# Patient Record
Sex: FEMALE | Race: WHITE | Employment: FULL TIME | URBAN - METROPOLITAN AREA
[De-identification: names, ages, dates, MRNs, and addresses within clinical notes are randomized per-mention and may not be internally consistent; named-entity substitution may affect disease eponyms.]

---

## 2017-02-06 ENCOUNTER — HOSPITAL ENCOUNTER (OUTPATIENT)
Dept: ENDOSCOPY | Age: 47
Discharge: HOME | End: 2017-02-06
Attending: INTERNAL MEDICINE | Admitting: INTERNAL MEDICINE

## 2017-02-06 LAB — HCG(URINE) PREGNANCY TEST: NEGATIVE

## 2017-02-06 RX ORDER — SODIUM CHLORIDE 9 MG/ML
INJECTION, SOLUTION INTRAVENOUS CONTINUOUS
Status: DISCONTINUED | OUTPATIENT
Start: 2017-02-06 | End: 2017-02-07 | Stop reason: HOSPADM

## 2017-02-06 RX ORDER — SODIUM CHLORIDE 0.9 % (FLUSH) 0.9 %
10 SYRINGE (ML) INJECTION PRN
Status: DISCONTINUED | OUTPATIENT
Start: 2017-02-06 | End: 2017-02-07 | Stop reason: HOSPADM

## 2017-02-06 RX ORDER — SODIUM CHLORIDE 0.9 % (FLUSH) 0.9 %
10 SYRINGE (ML) INJECTION EVERY 12 HOURS SCHEDULED
Status: DISCONTINUED | OUTPATIENT
Start: 2017-02-06 | End: 2017-02-07 | Stop reason: HOSPADM

## 2017-06-29 RX ORDER — ATENOLOL 25 MG/1
TABLET ORAL
Qty: 30 TABLET | Refills: 0 | Status: SHIPPED | OUTPATIENT
Start: 2017-06-29 | End: 2017-10-02 | Stop reason: SDUPTHER

## 2017-07-17 ENCOUNTER — TELEPHONE (OUTPATIENT)
Dept: CARDIOLOGY CLINIC | Age: 47
End: 2017-07-17

## 2017-09-28 RX ORDER — ATENOLOL 25 MG/1
TABLET ORAL
Qty: 30 TABLET | Refills: 0 | OUTPATIENT
Start: 2017-09-28

## 2017-10-02 RX ORDER — ATENOLOL 25 MG/1
TABLET ORAL
Qty: 30 TABLET | Refills: 0 | Status: SHIPPED | OUTPATIENT
Start: 2017-10-02 | End: 2017-10-20 | Stop reason: SDUPTHER

## 2017-10-02 NOTE — TELEPHONE ENCOUNTER
Pt called I have appt scheduled with [facility] for 10/20/17 and pt needs atenolol (TENORMIN) 25 MG tablet however pt pharmacy is out. Pt is totally out of medication so please call to adv as soon as possible with a alternative medication.   Thank you CSF

## 2017-10-19 NOTE — PROGRESS NOTES
[address]   Electrophysiology Follow up    Referring Provider:  Mg Clancy MD     Chief Complaint   Patient presents with    Tachycardia     no rapid palps    Palpitations     uncommon skips       HPI:  Julia Zabala is a 55 y.o. female is here for her routine follow up visit for her arrhythmia. She underwent ablation for SVT (AT)  years ago in [place]. Echo in 2005 was normal, showing EF 60%. She remains on Atenolol. 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 She was seen in the ER at [facility] in 2014 for increased palpitations; EKG showed SR with PVC's by report. She was also dx'd with a Cerebellar aneurysm around the same time, which in retrospect was present for years and unchanged in size. Today, she states that overall she feels well. She has uncommon skip beats and no racing feelings. She goes in for MRI scan about once a year for surveillance of cerebellar aneurysm which has been stable. She denies chest pain, GRADY, dizziness. She exercises regularly by playing tennis and doing pilates. She does not smoke. She is compliant with Atenolol which she tolerates without side effects. 8/29/14  MRI of head  Impression:  1. Stable 2 mm vascular prominence at the origin of the right superior  cerebellar artery suspicious for a tiny aneurysm or infundibulum. Continued followup is recommended. Past Medical History:   has a past medical history of SVT (supraventricular tachycardia) ([address] Surgical History:   has a past surgical history that includes ablation of dysrhythmic focus. Social History:   reports that she quit smoking about 25 years ago. She has never used smokeless tobacco. She reports that she drinks alcohol. She reports that she does not use drugs. Family History:  family history is not on file.      Home Medications:  Outpatient Encounter Prescriptions as of 10/20/2017   Medication Sig Dispense Refill    atenolol (TENORMIN) 25 of concern. Currently on Atenolol 25 mg daily. EKG today SB with HR 57  Wll continue treatment with beta blockers as her palpitations are well tolerated overall. 2.  SVT ( AT): s/p ablation years ago. Plan:  Lucretia Carpenter has a stable cardiac status. 1.  Continue Atenolol 25 mg daily  2. Follow up in one year or sooner if needed. Delfina Jalloh.  Amanda CORONEL

## 2017-10-20 ENCOUNTER — OFFICE VISIT (OUTPATIENT)
Dept: CARDIOLOGY CLINIC | Age: 47
End: 2017-10-20

## 2017-10-20 VITALS
OXYGEN SATURATION: 97 % | WEIGHT: 125.4 LBS | HEIGHT: 65 IN | HEART RATE: 68 BPM | BODY MASS INDEX: 20.89 KG/M2 | SYSTOLIC BLOOD PRESSURE: 114 MMHG | DIASTOLIC BLOOD PRESSURE: 72 MMHG

## 2017-10-20 DIAGNOSIS — I47.1 SVT (SUPRAVENTRICULAR TACHYCARDIA) (HCC): Primary | ICD-10-CM

## 2017-10-20 DIAGNOSIS — R00.2 PALPITATIONS: ICD-10-CM

## 2017-10-20 DIAGNOSIS — I47.1 ATRIAL TACHYCARDIA (HCC): ICD-10-CM

## 2017-10-20 PROCEDURE — G8420 CALC BMI NORM PARAMETERS: HCPCS | Performed by: INTERNAL MEDICINE

## 2017-10-20 PROCEDURE — G8484 FLU IMMUNIZE NO ADMIN: HCPCS | Performed by: INTERNAL MEDICINE

## 2017-10-20 PROCEDURE — 93000 ELECTROCARDIOGRAM COMPLETE: CPT | Performed by: INTERNAL MEDICINE

## 2017-10-20 PROCEDURE — G8427 DOCREV CUR MEDS BY ELIG CLIN: HCPCS | Performed by: INTERNAL MEDICINE

## 2017-10-20 PROCEDURE — 99213 OFFICE O/P EST LOW 20 MIN: CPT | Performed by: INTERNAL MEDICINE

## 2017-10-20 PROCEDURE — 1036F TOBACCO NON-USER: CPT | Performed by: INTERNAL MEDICINE

## 2017-10-20 RX ORDER — ATENOLOL 25 MG/1
TABLET ORAL
Qty: 90 TABLET | Refills: 3 | Status: CANCELLED | OUTPATIENT
Start: 2017-10-20

## 2017-10-20 RX ORDER — ATENOLOL 25 MG/1
TABLET ORAL
Qty: 90 TABLET | Refills: 3 | Status: SHIPPED | OUTPATIENT
Start: 2017-10-20 | End: 2018-09-17

## 2017-10-20 NOTE — LETTER
[address] [address] [address] [address]  Phone: [phone]  Fax: [phone]    Linda Baca MD        November 6, 2017     Marilyn Stephenson, [address] [place] [address] [address] [id number]    Patient: Alexandra Guzman  MR Number: [id number]  YOB: 1970  Date of Visit: 10/20/2017    Dear Dr. Marilyn Stephenson:    Below are the relevant portions of my assessment and plan of care. [address]   Electrophysiology Follow up    Referring Provider:  Marilyn Stephenson MD     Chief Complaint   Patient presents with    Tachycardia     no rapid palps    Palpitations     uncommon skips       HPI:  Alexandra Guzman is a 55 y.o. female is here for her routine follow up visit for her arrhythmia. She underwent ablation for SVT (AT)  years ago in [place]. Echo in 2005 was normal, showing EF 60%. She remains on Atenolol. 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 She was seen in the ER at [facility] in 2014 for increased palpitations; EKG showed SR with PVC's by report. She was also dx'd with a Cerebellar aneurysm around the same time, which in retrospect was present for years and unchanged in size. Today, she states that overall she feels well. She has uncommon skip beats and no racing feelings. She goes in for MRI scan about once a year for surveillance of cerebellar aneurysm which has been stable. She denies chest pain, GRADY, dizziness. She exercises regularly by playing tennis and doing pilates. She does not smoke. She is compliant with Atenolol which she tolerates without side effects. 8/29/14  MRI of head  Impression:  1. Stable 2 mm vascular prominence at the origin of the right superior  cerebellar artery suspicious for a tiny aneurysm or infundibulum. Continued followup is recommended. Past Medical History:   has a past medical history of SVT (supraventricular tachycardia) ([address]     Surgical History: has a past surgical history that includes ablation of dysrhythmic focus. Social History:   reports that she quit smoking about 25 years ago. She has never used smokeless tobacco. She reports that she drinks alcohol. She reports that she does not use drugs. Family History:  family history is not on file. Home Medications:  Outpatient Encounter Prescriptions as of 10/20/2017   Medication Sig Dispense Refill    atenolol (TENORMIN) 25 MG tablet TAKE 1 TABLET BY MOUTH DAILY 90 tablet 3    Dexlansoprazole (DEXILANT PO) Take by mouth      [DISCONTINUED] atenolol (TENORMIN) 25 MG tablet TAKE 1 TABLET BY MOUTH DAILY 30 tablet 0    ALPRAZolam (XANAX) 0.25 MG tablet Take 0.25 mg by mouth as needed for Sleep (Only when she fly/)        No facility-administered encounter medications on file as of 10/20/2017. Allergies:  Diflucan [fluconazole]     Review of Systems   Constitutional: Negative for activity change and appetite change. HENT: Negative for facial swelling and neck pain. Eyes: Negative for discharge and itching. Respiratory: Negative for chest tightness and shortness of breath. Cardiovascular: few palpitations. Negative for chest pain. Negative for leg swelling. Gastrointestinal: Negative for abdominal pain and abdominal distention. Genitourinary: Negative. Musculoskeletal: Negative. Skin: Negative for color change and pallor. Neurological: Negative for dizziness, syncope and light-headedness. Hematological: Negative. Psychiatric/Behavioral: Negative for behavioral problems and agitation. /72 (Site: Left Arm, Position: Sitting, Cuff Size: Medium Adult)   Pulse 68   Ht 5' 5\" (1.651 m)   Wt 125 lb 6.4 oz (56.9 kg)   SpO2 97%   BMI 20.87 kg/m²      Objective:  Physical Exam   Nursing note and vitals reviewed. Constitutional: She appears well-developed and well-nourished. HENT: neg  Head:  atraumatic. Eyes: Right eye exhibits no discharge. Left eye exhibits no discharge. Neck: Neck supple. Cardiovascular: Bradycardic rate, regular rhythm and normal heart sounds without murmur, gallop, or rub. Pulmonary/Chest: Effort normal and breath sounds normal.   Abdominal: Soft. Musculoskeletal: She exhibits no edema. mild scoliosis  Neurological: She is alert. Skin: Skin is warm and dry. Psychiatric: She has a normal mood and affect. Assessment:   1. Palpitations:  No current palpitations of concern. Currently on Atenolol 25 mg daily. EKG today SB with HR 57  Wll continue treatment with beta blockers as her palpitations are well tolerated overall. 2.  SVT ( AT): s/p ablation years ago. Plan:  Samreen Cooper has a stable cardiac status. 1.  Continue Atenolol 25 mg daily  2. Follow up in one year or sooner if needed. Janet Moeller. Amanda CORONEL If you have questions, please do not hesitate to call me. I look forward to following Samreen Cooper along with you.     Sincerely,        Yenni Harris MD

## 2017-11-06 NOTE — COMMUNICATION BODY
[facility]   Electrophysiology Follow up    Referring Provider:  Mookie Louise MD     Chief Complaint   Patient presents with    Tachycardia     no rapid palps    Palpitations     uncommon skips       HPI:  Ayla Griffith is a 55 y.o. female is here for her routine follow up visit for her arrhythmia. She underwent ablation for SVT (AT)  years ago in [place]. Echo in 2005 was normal, showing EF 60%. She remains on Atenolol. 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 She was seen in the ER at [facility] in 2014 for increased palpitations; EKG showed SR with PVC's by report. She was also dx'd with a Cerebellar aneurysm around the same time, which in retrospect was present for years and unchanged in size. Today, she states that overall she feels well. She has uncommon skip beats and no racing feelings. She goes in for MRI scan about once a year for surveillance of cerebellar aneurysm which has been stable. She denies chest pain, GRADY, dizziness. She exercises regularly by playing tennis and doing pilates. She does not smoke. She is compliant with Atenolol which she tolerates without side effects. 8/29/14  MRI of head  Impression:  1. Stable 2 mm vascular prominence at the origin of the right superior  cerebellar artery suspicious for a tiny aneurysm or infundibulum. Continued followup is recommended. Past Medical History:   has a past medical history of SVT (supraventricular tachycardia) ([address] Surgical History:   has a past surgical history that includes ablation of dysrhythmic focus. Social History:   reports that she quit smoking about 25 years ago. She has never used smokeless tobacco. She reports that she drinks alcohol. She reports that she does not use drugs. Family History:  family history is not on file.      Home Medications:  Outpatient Encounter Prescriptions as of 10/20/2017   Medication Sig Dispense Refill    atenolol (TENORMIN) 25 MG tablet TAKE 1 TABLET BY MOUTH DAILY 90 tablet 3    Dexlansoprazole (DEXILANT PO) Take by mouth      [DISCONTINUED] atenolol (TENORMIN) 25 MG tablet TAKE 1 TABLET BY MOUTH DAILY 30 tablet 0    ALPRAZolam (XANAX) 0.25 MG tablet Take 0.25 mg by mouth as needed for Sleep (Only when she fly/)        No facility-administered encounter medications on file as of 10/20/2017. Allergies:  Diflucan [fluconazole]     Review of Systems   Constitutional: Negative for activity change and appetite change. HENT: Negative for facial swelling and neck pain. Eyes: Negative for discharge and itching. Respiratory: Negative for chest tightness and shortness of breath. Cardiovascular: few palpitations. Negative for chest pain. Negative for leg swelling. Gastrointestinal: Negative for abdominal pain and abdominal distention. Genitourinary: Negative. Musculoskeletal: Negative. Skin: Negative for color change and pallor. Neurological: Negative for dizziness, syncope and light-headedness. Hematological: Negative. Psychiatric/Behavioral: Negative for behavioral problems and agitation. /72 (Site: Left Arm, Position: Sitting, Cuff Size: Medium Adult)   Pulse 68   Ht 5' 5\" (1.651 m)   Wt 125 lb 6.4 oz (56.9 kg)   SpO2 97%   BMI 20.87 kg/m²      Objective:  Physical Exam   Nursing note and vitals reviewed. Constitutional: She appears well-developed and well-nourished. HENT: neg  Head:  atraumatic. Eyes: Right eye exhibits no discharge. Left eye exhibits no discharge. Neck: Neck supple. Cardiovascular: Bradycardic rate, regular rhythm and normal heart sounds without murmur, gallop, or rub. Pulmonary/Chest: Effort normal and breath sounds normal.   Abdominal: Soft. Musculoskeletal: She exhibits no edema. mild scoliosis  Neurological: She is alert. Skin: Skin is warm and dry. Psychiatric: She has a normal mood and affect. Assessment:   1.  Palpitations:  No current palpitations of concern. Currently on Atenolol 25 mg daily. EKG today SB with HR 57  Wll continue treatment with beta blockers as her palpitations are well tolerated overall. 2.  SVT ( AT): s/p ablation years ago. Plan:  Kasandra Knapp has a stable cardiac status. 1.  Continue Atenolol 25 mg daily  2. Follow up in one year or sooner if needed. Leonid Patel M.D.

## 2018-09-17 ENCOUNTER — TELEPHONE (OUTPATIENT)
Dept: CARDIOLOGY CLINIC | Age: 48
End: 2018-09-17

## 2018-09-17 ENCOUNTER — OFFICE VISIT (OUTPATIENT)
Dept: CARDIOLOGY CLINIC | Age: 48
End: 2018-09-17

## 2018-09-17 VITALS
HEIGHT: 64 IN | HEART RATE: 75 BPM | WEIGHT: 129 LBS | BODY MASS INDEX: 22.02 KG/M2 | DIASTOLIC BLOOD PRESSURE: 69 MMHG | SYSTOLIC BLOOD PRESSURE: 108 MMHG

## 2018-09-17 DIAGNOSIS — R00.2 PALPITATIONS: ICD-10-CM

## 2018-09-17 DIAGNOSIS — I47.1 ATRIAL TACHYCARDIA (HCC): Primary | ICD-10-CM

## 2018-09-17 DIAGNOSIS — R07.89 OTHER CHEST PAIN: ICD-10-CM

## 2018-09-17 PROCEDURE — 93000 ELECTROCARDIOGRAM COMPLETE: CPT | Performed by: NURSE PRACTITIONER

## 2018-09-17 PROCEDURE — 99214 OFFICE O/P EST MOD 30 MIN: CPT | Performed by: NURSE PRACTITIONER

## 2018-09-17 PROCEDURE — G8420 CALC BMI NORM PARAMETERS: HCPCS | Performed by: NURSE PRACTITIONER

## 2018-09-17 PROCEDURE — 1036F TOBACCO NON-USER: CPT | Performed by: NURSE PRACTITIONER

## 2018-09-17 PROCEDURE — G8427 DOCREV CUR MEDS BY ELIG CLIN: HCPCS | Performed by: NURSE PRACTITIONER

## 2018-09-17 RX ORDER — ATENOLOL 25 MG/1
12.5 TABLET ORAL DAILY
Qty: 90 TABLET | Refills: 2 | Status: SHIPPED | OUTPATIENT
Start: 2018-09-17 | End: 2019-01-09

## 2018-09-17 NOTE — TELEPHONE ENCOUNTER
Needs an appt for further evaluation.  Please call her schedule either at [facility] or [facility]. She can see a NP at [facility] or Amanda at [facility] or [facility]. Thanks!!

## 2018-09-17 NOTE — LETTER
worrying here and she attributed it to nerves/anxiety. She has been under a lot of stress and has a hx of anxiety w/some panic attacks. She became very worried and scheduled a stat visit to the office because she felt very fatigued while playing tennis this weekend and this is new for her. She lives a very active life and exercises frequently w/out symptoms. She hasn't taken her BB in > month      Past Medical History:   Diagnosis Date    SVT (supraventricular tachycardia) ([address]         Past Surgical History:   Procedure Laterality Date    ABLATION OF DYSRHYTHMIC FOCUS         Allergies: Allergies   Allergen Reactions    Diflucan [Fluconazole]      Palpitations         Social History:  Reviewed. reports that she quit smoking about 26 years ago. She has never used smokeless tobacco. She reports that she drinks alcohol. She reports that she does not use drugs. Family History:  Reviewed. family history is not on file. Review of System:  All other systems reviewed and are negative except for that noted above. Pertinent negatives are:     · General: negative for fever, chills   · Ophthalmic ROS: negative for - eye pain or loss of vision  · ENT ROS: negative for - headaches, sore throat   · Respiratory: negative for - cough, sputum  · Cardiovascular: Reviewed in HPI  · Gastrointestinal: negative for - abdominal pain, diarrhea, N/V  · Hematology: negative for - bleeding, blood clots, bruising or jaundice  · Genito-Urinary:  negative for - Dysuria or incontinence  · Musculoskeletal: negative for - Joint swelling, muscle pain  · Neurological: negative for - confusion, dizziness, headaches   · Psychiatric: No anxiety, no depression. · Dermatological: negative for - rash    Physical Examination:  Vitals:    09/17/18 1535   BP: 108/69   Pulse: 75        · Constitutional: Oriented. No distress. · Head: Normocephalic and atraumatic.    · Mouth/Throat: Oropharynx is clear and moist.

## 2018-09-17 NOTE — TELEPHONE ENCOUNTER
Assessment:   1. Palpitations:  No current palpitations of concern. Currently on Atenolol 25 mg daily. EKG today SB with HR 57  Wll continue treatment with beta blockers as her palpitations are well tolerated overall. 2.  SVT ( AT): s/p ablation years ago. Plan:  Shannon Solis has a stable cardiac status. 1.  Continue Atenolol 25 mg daily  2. Follow up in one year or sooner if needed. Neyda Ramos.  Amanda CORONEL

## 2018-09-17 NOTE — PROGRESS NOTES
rash noted. · Psychiatric: Has a normal behavior     Labs, diagnostic and imaging results reviewed. ECG: SR, rate 72    Stress Echo 11/2005 at [facility]  -No wall motion abnormalities  -EF 60%      8/29/14  MRI of head  Impression:  1. Stable 2 mm vascular prominence at the origin of the right superior  cerebellar artery suspicious for a tiny aneurysm or infundibulum. Continued followup is recommended. Medication:  Current Outpatient Prescriptions   Medication Sig Dispense Refill    atenolol (TENORMIN) 25 MG tablet TAKE 1 TABLET BY MOUTH DAILY 90 tablet 3    Dexlansoprazole (DEXILANT PO) Take by mouth      ALPRAZolam (XANAX) 0.25 MG tablet Take 0.25 mg by mouth as needed for Sleep (Only when she fly/)        No current facility-administered medications for this visit. Assessment     Interval History:   Presents today for an acute visit. Has been endorsing cp over the last two weeks. Sharp, 3/10, left sternal, non-radiating, not associated w/activity or rest, no exacerbating or alleviating factors, no diaphoresis, nausea, or associated symptoms. The chest pain wasn't really worrying here and she attributed it to nerves/anxiety. She has been under a lot of stress and has a hx of anxiety w/some panic attacks. She became very worried and scheduled a stat visit to the office because she felt very fatigued while playing tennis this weekend and this is new for her. She lives a very active life and exercises frequently w/out symptoms.   She hasn't taken her BB in > month    Chest pain  -Atypical, w/symptoms not appearing cardiac in nature  -Healthy, active, young, w/out personal of family sig CAD history  -EKG NSR w/out ischemic changes    Fatigue  -While playing tennis this weekend as above  -Low suspicion of cardiac etiology  -More likely dehydration and \"off day\", though can't fully r/out tachy induced since hasn't been taking BB    Palpitations  -Stopped taking atenolol 2/2 lack of

## 2018-10-01 PROBLEM — R07.89 OTHER CHEST PAIN: Status: ACTIVE | Noted: 2018-10-01

## 2018-12-28 ENCOUNTER — NURSE ONLY (OUTPATIENT)
Dept: CARDIOLOGY CLINIC | Age: 48
End: 2018-12-28
Payer: COMMERCIAL

## 2018-12-28 ENCOUNTER — TELEPHONE (OUTPATIENT)
Dept: CARDIOLOGY CLINIC | Age: 48
End: 2018-12-28

## 2018-12-28 DIAGNOSIS — R00.2 PALPITATIONS: Primary | ICD-10-CM

## 2018-12-28 PROCEDURE — 93000 ELECTROCARDIOGRAM COMPLETE: CPT | Performed by: INTERNAL MEDICINE

## 2018-12-28 NOTE — TELEPHONE ENCOUNTER
Pt calling has been feeling faint lately, she has an Apple watch and her HR has been showing really low at times. Needs to know what to do?  Pls call to advise Thank you

## 2019-01-08 ENCOUNTER — TELEPHONE (OUTPATIENT)
Dept: CARDIOLOGY CLINIC | Age: 49
End: 2019-01-08

## 2019-01-08 DIAGNOSIS — I47.1 ATRIAL TACHYCARDIA (HCC): Primary | ICD-10-CM

## 2019-01-08 DIAGNOSIS — R06.02 SOB (SHORTNESS OF BREATH): ICD-10-CM

## 2019-01-08 DIAGNOSIS — R00.2 PALPITATIONS: ICD-10-CM

## 2019-01-08 DIAGNOSIS — R53.83 OTHER FATIGUE: ICD-10-CM

## 2019-01-08 DIAGNOSIS — I49.8 OTHER CARDIAC ARRHYTHMIA: ICD-10-CM

## 2019-01-08 DIAGNOSIS — R07.89 OTHER CHEST PAIN: ICD-10-CM

## 2019-01-09 ENCOUNTER — TELEPHONE (OUTPATIENT)
Dept: CARDIOLOGY CLINIC | Age: 49
End: 2019-01-09

## 2019-01-09 ENCOUNTER — OFFICE VISIT (OUTPATIENT)
Dept: CARDIOLOGY CLINIC | Age: 49
End: 2019-01-09
Payer: COMMERCIAL

## 2019-01-09 ENCOUNTER — HOSPITAL ENCOUNTER (OUTPATIENT)
Age: 49
Discharge: HOME | End: 2019-01-09
Payer: COMMERCIAL

## 2019-01-09 VITALS
OXYGEN SATURATION: 99 % | DIASTOLIC BLOOD PRESSURE: 76 MMHG | HEART RATE: 74 BPM | HEIGHT: 64 IN | BODY MASS INDEX: 22.02 KG/M2 | SYSTOLIC BLOOD PRESSURE: 108 MMHG | WEIGHT: 129 LBS

## 2019-01-09 DIAGNOSIS — I47.1 SVT (SUPRAVENTRICULAR TACHYCARDIA) (HCC): ICD-10-CM

## 2019-01-09 DIAGNOSIS — R00.2 PALPITATIONS: ICD-10-CM

## 2019-01-09 DIAGNOSIS — I47.1 ATRIAL TACHYCARDIA (HCC): ICD-10-CM

## 2019-01-09 DIAGNOSIS — I47.1 ATRIAL TACHYCARDIA (HCC): Primary | ICD-10-CM

## 2019-01-09 LAB
A/G RATIO: 1.8 (ref 1.1–2.2)
ALBUMIN SERPL-MCNC: 4.6 G/DL (ref 3.4–5)
ALP BLD-CCNC: 50 U/L (ref 40–129)
ALT SERPL-CCNC: 15 U/L (ref 10–40)
ANION GAP SERPL CALCULATED.3IONS-SCNC: 18 MMOL/L (ref 3–16)
AST SERPL-CCNC: 18 U/L (ref 15–37)
BILIRUB SERPL-MCNC: 0.7 MG/DL (ref 0–1)
BUN BLDV-MCNC: 8 MG/DL (ref 7–20)
CALCIUM SERPL-MCNC: 9.6 MG/DL (ref 8.3–10.6)
CHLORIDE BLD-SCNC: 103 MMOL/L (ref 99–110)
CHOLESTEROL, TOTAL: 224 MG/DL (ref 0–199)
CO2: 23 MMOL/L (ref 21–32)
CREAT SERPL-MCNC: 0.8 MG/DL (ref 0.6–1.1)
GFR AFRICAN AMERICAN: >60
GFR NON-AFRICAN AMERICAN: >60
GLOBULIN: 2.6 G/DL
GLUCOSE BLD-MCNC: 88 MG/DL (ref 70–99)
HCT VFR BLD CALC: 41.9 % (ref 36–48)
HDLC SERPL-MCNC: 93 MG/DL (ref 40–60)
HEMOGLOBIN: 14.1 G/DL (ref 12–16)
LDL CHOLESTEROL CALCULATED: 122 MG/DL
MCH RBC QN AUTO: 32.8 PG (ref 26–34)
MCHC RBC AUTO-ENTMCNC: 33.7 G/DL (ref 31–36)
MCV RBC AUTO: 97.4 FL (ref 80–100)
PDW BLD-RTO: 13.6 % (ref 12.4–15.4)
PLATELET # BLD: 231 K/UL (ref 135–450)
PMV BLD AUTO: 8.2 FL (ref 5–10.5)
POTASSIUM SERPL-SCNC: 4.7 MMOL/L (ref 3.5–5.1)
RBC # BLD: 4.3 M/UL (ref 4–5.2)
SODIUM BLD-SCNC: 144 MMOL/L (ref 136–145)
TOTAL PROTEIN: 7.2 G/DL (ref 6.4–8.2)
TRIGL SERPL-MCNC: 43 MG/DL (ref 0–150)
TSH REFLEX: 1.37 UIU/ML (ref 0.27–4.2)
VLDLC SERPL CALC-MCNC: 9 MG/DL
WBC # BLD: 6 K/UL (ref 4–11)

## 2019-01-09 PROCEDURE — G8484 FLU IMMUNIZE NO ADMIN: HCPCS | Performed by: NURSE PRACTITIONER

## 2019-01-09 PROCEDURE — G8420 CALC BMI NORM PARAMETERS: HCPCS | Performed by: NURSE PRACTITIONER

## 2019-01-09 PROCEDURE — 1036F TOBACCO NON-USER: CPT | Performed by: NURSE PRACTITIONER

## 2019-01-09 PROCEDURE — 99214 OFFICE O/P EST MOD 30 MIN: CPT | Performed by: NURSE PRACTITIONER

## 2019-01-09 PROCEDURE — 36415 COLL VENOUS BLD VENIPUNCTURE: CPT

## 2019-01-09 PROCEDURE — 93000 ELECTROCARDIOGRAM COMPLETE: CPT | Performed by: NURSE PRACTITIONER

## 2019-01-09 PROCEDURE — 84443 ASSAY THYROID STIM HORMONE: CPT

## 2019-01-09 PROCEDURE — 85027 COMPLETE CBC AUTOMATED: CPT

## 2019-01-09 PROCEDURE — 80061 LIPID PANEL: CPT

## 2019-01-09 PROCEDURE — 80053 COMPREHEN METABOLIC PANEL: CPT

## 2019-01-09 PROCEDURE — G8427 DOCREV CUR MEDS BY ELIG CLIN: HCPCS | Performed by: NURSE PRACTITIONER

## 2019-01-10 ENCOUNTER — TELEPHONE (OUTPATIENT)
Dept: CARDIOLOGY CLINIC | Age: 49
End: 2019-01-10

## 2019-01-10 ENCOUNTER — HOSPITAL ENCOUNTER (OUTPATIENT)
Dept: NON INVASIVE DIAGNOSTICS | Age: 49
Discharge: HOME | End: 2019-01-10
Payer: COMMERCIAL

## 2019-01-10 DIAGNOSIS — R07.89 OTHER CHEST PAIN: ICD-10-CM

## 2019-01-10 LAB
LV EF: 50 %
LVEF MODALITY: NORMAL

## 2019-01-10 PROCEDURE — 93351 STRESS TTE COMPLETE: CPT

## 2019-01-10 PROCEDURE — 93320 DOPPLER ECHO COMPLETE: CPT

## 2019-01-30 ENCOUNTER — OFFICE VISIT (OUTPATIENT)
Dept: CARDIOLOGY CLINIC | Age: 49
End: 2019-01-30
Payer: COMMERCIAL

## 2019-01-30 VITALS
BODY MASS INDEX: 22.53 KG/M2 | WEIGHT: 132 LBS | HEART RATE: 68 BPM | SYSTOLIC BLOOD PRESSURE: 99 MMHG | DIASTOLIC BLOOD PRESSURE: 65 MMHG | HEIGHT: 64 IN

## 2019-01-30 DIAGNOSIS — I47.1 SVT (SUPRAVENTRICULAR TACHYCARDIA) (HCC): ICD-10-CM

## 2019-01-30 DIAGNOSIS — R00.2 PALPITATIONS: Primary | ICD-10-CM

## 2019-01-30 DIAGNOSIS — I49.8 OTHER CARDIAC ARRHYTHMIA: ICD-10-CM

## 2019-01-30 DIAGNOSIS — I47.1 ATRIAL TACHYCARDIA (HCC): ICD-10-CM

## 2019-01-30 PROCEDURE — 99214 OFFICE O/P EST MOD 30 MIN: CPT | Performed by: INTERNAL MEDICINE

## 2019-01-30 PROCEDURE — G8427 DOCREV CUR MEDS BY ELIG CLIN: HCPCS | Performed by: INTERNAL MEDICINE

## 2019-01-30 PROCEDURE — 1036F TOBACCO NON-USER: CPT | Performed by: INTERNAL MEDICINE

## 2019-01-30 PROCEDURE — G8484 FLU IMMUNIZE NO ADMIN: HCPCS | Performed by: INTERNAL MEDICINE

## 2019-01-30 PROCEDURE — 93000 ELECTROCARDIOGRAM COMPLETE: CPT | Performed by: INTERNAL MEDICINE

## 2019-01-30 PROCEDURE — G8420 CALC BMI NORM PARAMETERS: HCPCS | Performed by: INTERNAL MEDICINE

## 2019-01-31 PROCEDURE — 93272 ECG/REVIEW INTERPRET ONLY: CPT | Performed by: INTERNAL MEDICINE

## 2022-09-15 ENCOUNTER — HOSPITAL ENCOUNTER (OUTPATIENT)
Dept: MAMMOGRAPHY | Age: 52
Discharge: HOME | End: 2022-09-15
Payer: COMMERCIAL

## 2022-09-15 ENCOUNTER — HOSPITAL ENCOUNTER (OUTPATIENT)
Dept: ULTRASOUND IMAGING | Age: 52
Discharge: HOME | End: 2022-09-15
Payer: COMMERCIAL

## 2022-09-15 VITALS — WEIGHT: 123 LBS | BODY MASS INDEX: 21 KG/M2 | HEIGHT: 64 IN

## 2022-09-15 DIAGNOSIS — N63.0 BREAST LUMP: ICD-10-CM

## 2022-09-15 DIAGNOSIS — N61.0 INFLAMMATORY DISEASE OF BREAST: ICD-10-CM

## 2022-09-15 PROCEDURE — 76642 ULTRASOUND BREAST LIMITED: CPT

## 2022-09-15 PROCEDURE — G0279 TOMOSYNTHESIS, MAMMO: HCPCS

## 2023-11-06 ENCOUNTER — HOSPITAL ENCOUNTER (OUTPATIENT)
Dept: MAMMOGRAPHY | Age: 53
Discharge: HOME | End: 2023-11-06
Payer: COMMERCIAL

## 2023-11-06 ENCOUNTER — HOSPITAL ENCOUNTER (OUTPATIENT)
Dept: ULTRASOUND IMAGING | Age: 53
Discharge: HOME | End: 2023-11-06
Payer: COMMERCIAL

## 2023-11-06 VITALS — WEIGHT: 123 LBS | BODY MASS INDEX: 21 KG/M2 | HEIGHT: 64 IN

## 2023-11-06 DIAGNOSIS — R93.89 ABNORMAL ULTRASOUND: ICD-10-CM

## 2023-11-06 DIAGNOSIS — Z12.31 VISIT FOR SCREENING MAMMOGRAM: ICD-10-CM

## 2023-11-06 PROCEDURE — 76642 ULTRASOUND BREAST LIMITED: CPT

## 2023-11-06 PROCEDURE — 77063 BREAST TOMOSYNTHESIS BI: CPT

## 2024-01-09 ENCOUNTER — TELEPHONE (OUTPATIENT)
Dept: CARDIOLOGY CLINIC | Age: 54
End: 2024-01-09

## 2024-01-09 NOTE — TELEPHONE ENCOUNTER
Dawna is calling in to schedule f/u appt. Explained that Amanda is no longer practicing here. She last seen him in 2019.    Please assist on who I can schedule her with.    Dawna can be reached at [phone].

## 2024-01-10 NOTE — TELEPHONE ENCOUNTER
Looks like the patient lives around the [facility]. [facility] would be the closet to her with follow up with Dr. Flores.

## 2024-01-30 NOTE — PROGRESS NOTES
[facility]     Outpatient Cardiology         Patient Name:  Dawna Banuelos  Requesting Physician: No admitting provider for patient encounter.  Primary Care Physician: SHAY Staples MD    Reason for Consultation/Chief Complaint:   Chief Complaint   Patient presents with    Palpitations     Intermittent with bouts of tachycardia    Other     Hx of SVT and ablation          History of Present Illness:    HPI     Dawna Vazquez a 53 y.o. female with PMH of SVT, SVT ablation in [place], cerebellar aneurysm.    Here to establish care for SVT. Previously seen by Dr. Patel.   SVT had ablation in   Palpitations, ongoing for several weeks, HR as high as 190s (apple watch, no tracings), no other sxs, no particular triggering factors. Drinks 1 cup of coffee and 2 diet cokes.   Discussed CAC for screening purposes given HLD.   Otherwise no cardiac sxs.       PMH  Past Medical History:   Diagnosis Date    SVT (supraventricular tachycardia)        PSH  Past Surgical History:   Procedure Laterality Date    ABLATION OF DYSRHYTHMIC FOCUS      BREAST BIOPSY Right 2018    needle bx        Social HIstory  Social History     Tobacco Use    Smoking status: Former     Current packs/day: 0.00     Types: Cigarettes     Quit date: 1992     Years since quittin.0    Smokeless tobacco: Never   Substance Use Topics    Alcohol use: Yes     Comment: social    Drug use: No       Family History  Family History   Problem Relation Age of Onset    Heart Disease Neg Hx     High Blood Pressure Neg Hx     High Cholesterol Neg Hx        Allergies   Allergies   Allergen Reactions    Diflucan [Fluconazole]      Palpitations         Medications:     Home Medications:  Were reviewed and are listed in nursing record. and/or listed below    Prior to Admission medications    Medication Sig Start Date End Date Taking? Authorizing Provider   ALPRAZolam (XANAX) 0.25 MG tablet Take 1 tablet by mouth as needed

## 2024-01-31 ENCOUNTER — OFFICE VISIT (OUTPATIENT)
Dept: CARDIOLOGY CLINIC | Age: 54
End: 2024-01-31
Payer: COMMERCIAL

## 2024-01-31 VITALS
DIASTOLIC BLOOD PRESSURE: 64 MMHG | BODY MASS INDEX: 22.86 KG/M2 | HEART RATE: 58 BPM | WEIGHT: 133.2 LBS | SYSTOLIC BLOOD PRESSURE: 102 MMHG

## 2024-01-31 DIAGNOSIS — I47.10 SVT (SUPRAVENTRICULAR TACHYCARDIA): ICD-10-CM

## 2024-01-31 DIAGNOSIS — E78.2 MIXED HYPERLIPIDEMIA: ICD-10-CM

## 2024-01-31 DIAGNOSIS — I47.19 ATRIAL TACHYCARDIA: Primary | ICD-10-CM

## 2024-01-31 DIAGNOSIS — R00.2 PALPITATIONS: ICD-10-CM

## 2024-01-31 PROCEDURE — G8427 DOCREV CUR MEDS BY ELIG CLIN: HCPCS | Performed by: INTERNAL MEDICINE

## 2024-01-31 PROCEDURE — G8420 CALC BMI NORM PARAMETERS: HCPCS | Performed by: INTERNAL MEDICINE

## 2024-01-31 PROCEDURE — 99204 OFFICE O/P NEW MOD 45 MIN: CPT | Performed by: INTERNAL MEDICINE

## 2024-01-31 PROCEDURE — 93000 ELECTROCARDIOGRAM COMPLETE: CPT | Performed by: INTERNAL MEDICINE

## 2024-01-31 PROCEDURE — 3017F COLORECTAL CA SCREEN DOC REV: CPT | Performed by: INTERNAL MEDICINE

## 2024-01-31 PROCEDURE — 1036F TOBACCO NON-USER: CPT | Performed by: INTERNAL MEDICINE

## 2024-01-31 PROCEDURE — G8484 FLU IMMUNIZE NO ADMIN: HCPCS | Performed by: INTERNAL MEDICINE

## 2024-01-31 ASSESSMENT — ENCOUNTER SYMPTOMS
SHORTNESS OF BREATH: 0
VOMITING: 0
BLOOD IN STOOL: 0
COLOR CHANGE: 0
EYE DISCHARGE: 0
WHEEZING: 0
CHEST TIGHTNESS: 0
ABDOMINAL PAIN: 0
BACK PAIN: 0
FACIAL SWELLING: 0
ABDOMINAL DISTENTION: 0
COUGH: 0

## 2024-02-19 ENCOUNTER — TELEPHONE (OUTPATIENT)
Dept: CARDIOLOGY CLINIC | Age: 54
End: 2024-02-19

## 2024-02-19 NOTE — TELEPHONE ENCOUNTER
Spoke with patient and reviewed results. Will wait to start new medications until she sees Dr. Head on Thursday 2/22/24.

## 2024-02-21 DIAGNOSIS — R00.2 PALPITATION: Primary | ICD-10-CM

## 2024-02-21 DIAGNOSIS — I47.19 ATRIAL TACHYCARDIA: ICD-10-CM

## 2024-02-21 DIAGNOSIS — I47.10 SVT (SUPRAVENTRICULAR TACHYCARDIA): ICD-10-CM

## 2024-02-21 ASSESSMENT — ENCOUNTER SYMPTOMS
CHEST TIGHTNESS: 0
FACIAL SWELLING: 0
BLOOD IN STOOL: 0
COLOR CHANGE: 0
ABDOMINAL DISTENTION: 0
WHEEZING: 0
SHORTNESS OF BREATH: 0
ABDOMINAL PAIN: 0
EYE DISCHARGE: 0
BACK PAIN: 0
COUGH: 0
VOMITING: 0

## 2024-02-21 NOTE — PROGRESS NOTES
[facility]     Outpatient Cardiology         Patient Name:  Dawna Banuelos  Requesting Physician: No admitting provider for patient encounter.  Primary Care Physician: SHAY Staples MD    Reason for Consultation/Chief Complaint:   Chief Complaint   Patient presents with    Results     S/p 2 week CAM     Palpitations    History of Present Illness:    HPI     Dawna Vazquez a 53 y.o. female with PMH of SVT, SVT ablation in [place], cerebellar aneurysm.    Here for follow up for palpitations and to review monitor results.   SVT had ablation in   Palpitations, still present although less pronounced at this point.  Monitor did not show symptomatic PACs and very short/brief episodes of A. tach.  13 day CAM showed NSR with average HR 70 (),18 episodes of atrial tachycardia, longest 15 beats, PAC 0.1%, PVC <0.1%, symptoms correlate with PACs.       PMH  Past Medical History:   Diagnosis Date    SVT (supraventricular tachycardia)        PSH  Past Surgical History:   Procedure Laterality Date    ABLATION OF DYSRHYTHMIC FOCUS      BREAST BIOPSY Right 2018    needle bx        Social HIstory  Social History     Tobacco Use    Smoking status: Former     Current packs/day: 0.00     Types: Cigarettes     Quit date: 1992     Years since quittin.0    Smokeless tobacco: Never   Substance Use Topics    Alcohol use: Yes     Comment: social    Drug use: No       Family History  Family History   Problem Relation Age of Onset    Heart Disease Neg Hx     High Blood Pressure Neg Hx     High Cholesterol Neg Hx        Allergies   Allergies   Allergen Reactions    Diflucan [Fluconazole]      Palpitations         Medications:     Home Medications:  Were reviewed and are listed in nursing record. and/or listed below    Prior to Admission medications    Medication Sig Start Date End Date Taking? Authorizing Provider   ALPRAZolam (XANAX) 0.25 MG tablet Take 1 tablet by mouth as needed

## 2024-02-22 ENCOUNTER — OFFICE VISIT (OUTPATIENT)
Dept: CARDIOLOGY CLINIC | Age: 54
End: 2024-02-22
Payer: COMMERCIAL

## 2024-02-22 VITALS
WEIGHT: 135.2 LBS | HEART RATE: 76 BPM | BODY MASS INDEX: 23.08 KG/M2 | SYSTOLIC BLOOD PRESSURE: 100 MMHG | HEIGHT: 64 IN | DIASTOLIC BLOOD PRESSURE: 60 MMHG

## 2024-02-22 DIAGNOSIS — R00.2 PALPITATIONS: Primary | ICD-10-CM

## 2024-02-22 DIAGNOSIS — G47.33 OSA (OBSTRUCTIVE SLEEP APNEA): ICD-10-CM

## 2024-02-22 PROCEDURE — G8427 DOCREV CUR MEDS BY ELIG CLIN: HCPCS | Performed by: INTERNAL MEDICINE

## 2024-02-22 PROCEDURE — 1036F TOBACCO NON-USER: CPT | Performed by: INTERNAL MEDICINE

## 2024-02-22 PROCEDURE — 3017F COLORECTAL CA SCREEN DOC REV: CPT | Performed by: INTERNAL MEDICINE

## 2024-02-22 PROCEDURE — G8484 FLU IMMUNIZE NO ADMIN: HCPCS | Performed by: INTERNAL MEDICINE

## 2024-02-22 PROCEDURE — G8420 CALC BMI NORM PARAMETERS: HCPCS | Performed by: INTERNAL MEDICINE

## 2024-02-22 PROCEDURE — 99214 OFFICE O/P EST MOD 30 MIN: CPT | Performed by: INTERNAL MEDICINE

## 2024-02-22 NOTE — PATIENT INSTRUCTIONS
Patient will be started on new medication Lopressor.  Patient verbalizes understanding of the need for treatment and education provided at today's visit. Additional education materials will be provided in the AVS.

## 2024-02-22 NOTE — ASSESSMENT & PLAN NOTE
Symptoms correlate with very short runs of A. tach/PACs.  Start metoprolol 12.5 twice daily and further titrate as tolerated,  Borderline BP today.  Refer for sleep study

## 2024-03-29 ENCOUNTER — HOSPITAL ENCOUNTER (OUTPATIENT)
Age: 54
Discharge: HOME | End: 2024-03-29
Attending: INTERNAL MEDICINE
Payer: COMMERCIAL

## 2024-03-29 DIAGNOSIS — I47.19 ATRIAL TACHYCARDIA (HCC): ICD-10-CM

## 2024-03-29 DIAGNOSIS — I47.10 SVT (SUPRAVENTRICULAR TACHYCARDIA) (HCC): ICD-10-CM

## 2024-03-29 PROCEDURE — 75571 CT HRT W/O DYE W/CA TEST: CPT

## 2024-10-25 ENCOUNTER — HOSPITAL ENCOUNTER (OUTPATIENT)
Dept: ULTRASOUND IMAGING | Age: 54
Discharge: HOME | End: 2024-10-25
Payer: COMMERCIAL

## 2024-10-25 ENCOUNTER — HOSPITAL ENCOUNTER (OUTPATIENT)
Dept: MAMMOGRAPHY | Age: 54
Discharge: HOME | End: 2024-10-25
Payer: COMMERCIAL

## 2024-10-25 VITALS — BODY MASS INDEX: 23.05 KG/M2 | HEIGHT: 64 IN | WEIGHT: 135 LBS

## 2024-10-25 DIAGNOSIS — N63.21 LUMP IN UPPER OUTER QUADRANT OF LEFT BREAST: ICD-10-CM

## 2024-10-25 DIAGNOSIS — N63.20 MASS OF LEFT BREAST, UNSPECIFIED QUADRANT: ICD-10-CM

## 2024-10-25 PROCEDURE — G0279 TOMOSYNTHESIS, MAMMO: HCPCS

## 2024-10-25 PROCEDURE — 76642 ULTRASOUND BREAST LIMITED: CPT
